# Patient Record
Sex: MALE | Race: WHITE | NOT HISPANIC OR LATINO | Employment: OTHER | URBAN - METROPOLITAN AREA
[De-identification: names, ages, dates, MRNs, and addresses within clinical notes are randomized per-mention and may not be internally consistent; named-entity substitution may affect disease eponyms.]

---

## 2024-03-05 ENCOUNTER — TELEPHONE (OUTPATIENT)
Dept: PRIMARY CARE | Facility: CLINIC | Age: 74
End: 2024-03-05
Payer: COMMERCIAL

## 2024-03-05 DIAGNOSIS — E78.2 MIXED HYPERLIPIDEMIA: ICD-10-CM

## 2024-03-05 DIAGNOSIS — I10 HTN (HYPERTENSION), BENIGN: Primary | ICD-10-CM

## 2024-03-05 DIAGNOSIS — I25.10 CORONARY ARTERY DISEASE INVOLVING NATIVE CORONARY ARTERY OF NATIVE HEART WITHOUT ANGINA PECTORIS: ICD-10-CM

## 2024-03-05 DIAGNOSIS — Z12.5 PROSTATE CANCER SCREENING: ICD-10-CM

## 2024-03-05 NOTE — TELEPHONE ENCOUNTER
Pt has his annual scheduled for 3/27/24. He is requesting blood work  orders to have done before this visit.

## 2024-03-05 NOTE — TELEPHONE ENCOUNTER
Advise pt that lab orders were entered for him.  He can go to the lab and no longer needs to take paper orders with him.  Should do the labs FASTING about 4-7 days before his next appt.

## 2024-03-07 PROBLEM — L02.416 ABSCESS OF LEG, LEFT: Status: ACTIVE | Noted: 2024-03-07

## 2024-03-07 PROBLEM — I25.10 CAD (CORONARY ARTERY DISEASE): Status: ACTIVE | Noted: 2024-03-07

## 2024-03-07 PROBLEM — R10.9 RIGHT FLANK PAIN: Status: ACTIVE | Noted: 2024-03-07

## 2024-03-07 PROBLEM — R91.1 LUNG NODULE: Status: ACTIVE | Noted: 2024-03-07

## 2024-03-07 PROBLEM — L72.3 SEBACEOUS CYST: Status: ACTIVE | Noted: 2024-03-07

## 2024-03-07 PROBLEM — J96.01 ACUTE RESPIRATORY FAILURE WITH HYPOXIA (MULTI): Status: ACTIVE | Noted: 2020-11-28

## 2024-03-07 PROBLEM — U07.1 DISEASE DUE TO SEVERE ACUTE RESPIRATORY SYNDROME CORONAVIRUS 2 (SARS-COV-2): Status: ACTIVE | Noted: 2024-03-07

## 2024-03-07 PROBLEM — H91.90 HEARING LOSS: Status: ACTIVE | Noted: 2024-03-07

## 2024-03-07 PROBLEM — E78.5 HLD (HYPERLIPIDEMIA): Status: ACTIVE | Noted: 2024-03-07

## 2024-03-07 PROBLEM — H93.13 BILATERAL TINNITUS: Status: ACTIVE | Noted: 2019-02-06

## 2024-03-07 PROBLEM — U07.1 PNEUMONIA DUE TO COVID-19 VIRUS: Status: ACTIVE | Noted: 2020-11-28

## 2024-03-07 PROBLEM — R93.89 ABNORMAL CHEST XRAY: Status: ACTIVE | Noted: 2024-03-07

## 2024-03-07 PROBLEM — E66.3 OVERWEIGHT WITH BODY MASS INDEX (BMI) 25.0-29.9: Status: ACTIVE | Noted: 2024-03-07

## 2024-03-07 PROBLEM — R74.01 TRANSAMINITIS: Status: ACTIVE | Noted: 2020-11-28

## 2024-03-07 PROBLEM — J18.9 PNEUMONIA: Status: ACTIVE | Noted: 2024-03-07

## 2024-03-07 PROBLEM — H93.291 IMPAIRMENT OF AUDITORY DISCRIMINATION OF RIGHT EAR: Status: ACTIVE | Noted: 2021-03-18

## 2024-03-07 PROBLEM — H90.3 SENSORINEURAL HEARING LOSS, BILATERAL: Status: ACTIVE | Noted: 2019-02-06

## 2024-03-07 PROBLEM — J12.82 PNEUMONIA DUE TO COVID-19 VIRUS: Status: ACTIVE | Noted: 2020-11-28

## 2024-03-07 PROBLEM — S22.41XA MULTIPLE CLOSED FRACTURES OF RIBS OF RIGHT SIDE: Status: ACTIVE | Noted: 2024-03-07

## 2024-03-07 RX ORDER — LISINOPRIL 10 MG/1
15 TABLET ORAL
COMMUNITY
Start: 2023-10-30

## 2024-03-07 RX ORDER — ATORVASTATIN CALCIUM 80 MG/1
80 TABLET, FILM COATED ORAL
COMMUNITY
Start: 2023-10-30

## 2024-03-14 ENCOUNTER — LAB (OUTPATIENT)
Dept: LAB | Facility: LAB | Age: 74
End: 2024-03-14
Payer: MEDICARE

## 2024-03-14 DIAGNOSIS — I10 HTN (HYPERTENSION), BENIGN: ICD-10-CM

## 2024-03-14 DIAGNOSIS — Z12.5 PROSTATE CANCER SCREENING: ICD-10-CM

## 2024-03-14 DIAGNOSIS — I25.10 CORONARY ARTERY DISEASE INVOLVING NATIVE CORONARY ARTERY OF NATIVE HEART WITHOUT ANGINA PECTORIS: ICD-10-CM

## 2024-03-14 DIAGNOSIS — E78.2 MIXED HYPERLIPIDEMIA: ICD-10-CM

## 2024-03-14 LAB
ALBUMIN SERPL BCP-MCNC: 4.3 G/DL (ref 3.4–5)
ALP SERPL-CCNC: 80 U/L (ref 33–136)
ALT SERPL W P-5'-P-CCNC: 25 U/L (ref 10–52)
ANION GAP SERPL CALC-SCNC: 12 MMOL/L (ref 10–20)
AST SERPL W P-5'-P-CCNC: 23 U/L (ref 9–39)
BILIRUB SERPL-MCNC: 0.7 MG/DL (ref 0–1.2)
BUN SERPL-MCNC: 13 MG/DL (ref 6–23)
CALCIUM SERPL-MCNC: 9.7 MG/DL (ref 8.6–10.3)
CHLORIDE SERPL-SCNC: 103 MMOL/L (ref 98–107)
CHOLEST SERPL-MCNC: 181 MG/DL (ref 0–199)
CHOLESTEROL/HDL RATIO: 2.2
CO2 SERPL-SCNC: 30 MMOL/L (ref 21–32)
CREAT SERPL-MCNC: 0.87 MG/DL (ref 0.5–1.3)
EGFRCR SERPLBLD CKD-EPI 2021: >90 ML/MIN/1.73M*2
GLUCOSE SERPL-MCNC: 97 MG/DL (ref 74–99)
HDLC SERPL-MCNC: 81.2 MG/DL
LDLC SERPL CALC-MCNC: 85 MG/DL
NON HDL CHOLESTEROL: 100 MG/DL (ref 0–149)
POTASSIUM SERPL-SCNC: 4.8 MMOL/L (ref 3.5–5.3)
PROT SERPL-MCNC: 7.2 G/DL (ref 6.4–8.2)
PSA SERPL-MCNC: 0.7 NG/ML
SODIUM SERPL-SCNC: 140 MMOL/L (ref 136–145)
TRIGL SERPL-MCNC: 74 MG/DL (ref 0–149)
TSH SERPL-ACNC: 3.29 MIU/L (ref 0.44–3.98)
VLDL: 15 MG/DL (ref 0–40)

## 2024-03-14 PROCEDURE — G0103 PSA SCREENING: HCPCS

## 2024-03-14 PROCEDURE — 80053 COMPREHEN METABOLIC PANEL: CPT

## 2024-03-14 PROCEDURE — 84443 ASSAY THYROID STIM HORMONE: CPT

## 2024-03-14 PROCEDURE — 80061 LIPID PANEL: CPT

## 2024-03-14 PROCEDURE — 36415 COLL VENOUS BLD VENIPUNCTURE: CPT

## 2024-03-27 ENCOUNTER — OFFICE VISIT (OUTPATIENT)
Dept: PRIMARY CARE | Facility: CLINIC | Age: 74
End: 2024-03-27
Payer: MEDICARE

## 2024-03-27 VITALS
HEIGHT: 71 IN | DIASTOLIC BLOOD PRESSURE: 84 MMHG | BODY MASS INDEX: 28.28 KG/M2 | OXYGEN SATURATION: 97 % | SYSTOLIC BLOOD PRESSURE: 138 MMHG | WEIGHT: 202 LBS | TEMPERATURE: 97.4 F | HEART RATE: 60 BPM

## 2024-03-27 DIAGNOSIS — Z80.0 FAMILY HISTORY OF COLON CANCER: ICD-10-CM

## 2024-03-27 DIAGNOSIS — R93.89 ABNORMAL CHEST XRAY: ICD-10-CM

## 2024-03-27 DIAGNOSIS — I10 HTN (HYPERTENSION), BENIGN: ICD-10-CM

## 2024-03-27 DIAGNOSIS — I25.10 CORONARY ARTERY DISEASE INVOLVING NATIVE CORONARY ARTERY OF NATIVE HEART WITHOUT ANGINA PECTORIS: ICD-10-CM

## 2024-03-27 DIAGNOSIS — Z00.00 MEDICARE ANNUAL WELLNESS VISIT, SUBSEQUENT: Primary | ICD-10-CM

## 2024-03-27 DIAGNOSIS — Z12.11 ENCOUNTER FOR SCREENING FOR MALIGNANT NEOPLASM OF COLON: ICD-10-CM

## 2024-03-27 DIAGNOSIS — E78.2 MIXED HYPERLIPIDEMIA: ICD-10-CM

## 2024-03-27 PROBLEM — J96.01 ACUTE RESPIRATORY FAILURE WITH HYPOXIA (MULTI): Status: RESOLVED | Noted: 2020-11-28 | Resolved: 2024-03-27

## 2024-03-27 PROCEDURE — 99212 OFFICE O/P EST SF 10 MIN: CPT | Performed by: FAMILY MEDICINE

## 2024-03-27 PROCEDURE — G0439 PPPS, SUBSEQ VISIT: HCPCS | Performed by: FAMILY MEDICINE

## 2024-03-27 PROCEDURE — 1160F RVW MEDS BY RX/DR IN RCRD: CPT | Performed by: FAMILY MEDICINE

## 2024-03-27 PROCEDURE — 1159F MED LIST DOCD IN RCRD: CPT | Performed by: FAMILY MEDICINE

## 2024-03-27 PROCEDURE — 3075F SYST BP GE 130 - 139MM HG: CPT | Performed by: FAMILY MEDICINE

## 2024-03-27 PROCEDURE — 3079F DIAST BP 80-89 MM HG: CPT | Performed by: FAMILY MEDICINE

## 2024-03-27 PROCEDURE — 1170F FXNL STATUS ASSESSED: CPT | Performed by: FAMILY MEDICINE

## 2024-03-27 ASSESSMENT — PATIENT HEALTH QUESTIONNAIRE - PHQ9
2. FEELING DOWN, DEPRESSED OR HOPELESS: NOT AT ALL
1. LITTLE INTEREST OR PLEASURE IN DOING THINGS: NOT AT ALL
SUM OF ALL RESPONSES TO PHQ9 QUESTIONS 1 AND 2: 0

## 2024-03-27 ASSESSMENT — ACTIVITIES OF DAILY LIVING (ADL)
GROCERY_SHOPPING: INDEPENDENT
DRESSING: INDEPENDENT
MANAGING_FINANCES: INDEPENDENT
TAKING_MEDICATION: INDEPENDENT
BATHING: INDEPENDENT
DOING_HOUSEWORK: INDEPENDENT

## 2024-03-27 NOTE — PATIENT INSTRUCTIONS
Recommended the RSV, Shingrix, and Prevnar 20 vaccines recommended.    Have chest x-ray as we discussed.    Follow up in 1 year and as needed.    It was a pleasure to see you today. Thank you for choosing us for your health care needs.    If you have lab or other testing completed and have not been informed of results within one week, please call the office for your results.    If you haven't done so, consider signing up for [facility] [employer], the [facility] personal health record. Ask the staff how you can get started.

## 2024-03-27 NOTE — PROGRESS NOTES
Subjective   Reason for Visit: Lawrence Epps is an 73 y.o. male here for a Medicare Wellness visit and follow up monitoring and management of multiple medical conditions.        Reviewed all medications by prescribing practitioner or clinical pharmacist (such as prescriptions, OTCs, herbal therapies and supplements) and documented in the medical record.    HPI    Last colonoscopy: Unknown  Last labs: 3/14/24         Pt has hypertension.   He does not monitor BP at home.   He is compliant with taking his lisinopril.  Denies chest pain, dizziness, LE swelling.      Pt has hyperlipidemia.   He is treated with atorvastatin.   He does try to limit the amount of fatty foods and cholesterol consumed.   Denies chest pain, palpitations and leg cramps.     Pt has known CAD.   Patient denies any chest pain, shortness of breath with exertion, claudication, paroxysmal nocturnal dyspnea, or orthopnea.  Follows with cardiology at the [facility] but is   overdue for follow up.      Patient Self Assessment of Health Status  Patient Self Assessment: Excellent    Nutrition and Exercise  Current Diet: Well Balanced Diet  Adequate Fluid Intake: No  Caffeine: Yes  Exercise Frequency: Regularly    Functional Ability/Level of Safety  Cognitive Impairment Observed: No cognitive impairment observed  Cognitive Impairment Reported: No cognitive impairment reported by patient or family    Home Safety Risk Factors: None    Patient Care Team:  Juan Jose Louis DO as PCP - General     Review of Systems  Constitutional: Patient denies any fever, chills, loss of appetite, or unexplained weight loss.  Cardiovascular: Patient denies any chest pain, shortness of breath with exertion, tachycardia, palpitations, orthopnea, or paroxysmal nocturnal dyspnea.  Respiratory: Patient denies any cough, shortness breath, or wheezing.  Gastrointestinal: Patient denies any nausea, vomiting, diarrhea, constipation, melena, hematochezia, or reflux  "symptoms.  Skin: Denies any rashes or skin lesions.   Neurology: Patient denies any new motor or sensory losses.  Denies any numbness, tingling, weakness, and incoordination of the extremities.  Patient also denies any tremor, seizures, or gait instability.  Endocrinology: Denies any polyuria, polydipsia, polyphagia, or heat/cold intolerance.      Objective   Vitals:  /84   Pulse 60   Temp 36.3 °C (97.4 °F)   Ht 1.803 m (5' 11\")   Wt 91.6 kg (202 lb)   SpO2 97%   BMI 28.17 kg/m²       Physical Exam  General Appearance: Alert and cooperative, in no acute distress, well-developed/well-nourished.  Neck: Supple and without adenopathy or rigidity.  There is no JVD at 90° and no carotid bruits are noted.  There is no thyromegaly, thyroid tenderness, or palpable thyroid nodules.  Heart: Regular rate and rhythm without murmur or ectopy.  Respiratory: Lungs are clear to auscultation bilaterally with good air exchange.  Good respiratory effort and no accessory muscle use.  Skin: Good turgor, moist, warm and without rashes or lesions.  Neurological exam: Alert and oriented ×3, no tremor, normal gait.  Extremities: No clubbing, cyanosis, or edema  Head: Normocephalic and atraumatic  Eyes: Conjunctiva and sclera appear normal bilaterally.  Anterior chambers appear clear.  Extraocular muscles are intact.  ENT: Mucous membranes are moist, external auditory canals and tympanic membranes are within normal limits bilaterally.  Pharynx is without erythema or exudate.  There is no noted rhinorrhea.  Lymph:  No noted cervical, clavicular, axillary, or inguinal adenopathy.  Abdomen: Soft, nontender/nondistended.  No masses, guarding, rebound, or rigidity.  Bowel sounds are normal.  No abdominal bruits.  No CVA tenderness.  There is no widening of the aortic pulsation.  Musculoskeletal: No noted joint effusions or gross bony deformity.      Assessment/Plan   MEDICARE ANNUAL WELLNESS EXAM: Appropriate screenings for the " patient's current age were discussed at length.  I encouraged a low-fat/low-cholesterol diet and routine exercise.  Prevnar 20, Shingrix, and RSV vaccines were recommended.      HTN: Blood pressure not ideal.  We discussed a goal of achieving blood pressures less than 130 systolic and less than 80 diastolic.  Patient will work on following a low-sodium diet, increasing his exercise and reducing his weight.  He will follow-up with cardiology at the [facility] at which time his blood pressure can be reassessed.  He is welcome to return here in the office to have a blood pressure check as well.  He may need to have his lisinopril dose increased if BP not improving.       Hyperlipidemia: Patient will continue with the current medication. Dietary changes, exercise, and maintenance of healthy weight were discussed at length.     CAD:   Stable based on symptoms.  Follows with cardiology at [facility].  Patient is without worrisome signs or symptoms for unstable angina.  Risk factor modification was discussed at length.  Dietary changes, exercise and maintenance of a healthy weight were discussed.  Had ECHO 8/1/2022:  The left ventricle is normal in size. Left ventricular systolic function is   normal. EF = 70 ± 5% (2D biplane) Normal left ventricular diastolic function.   - The right ventricle is normal in size. Right ventricular systolic function is normal.   - Exam was compared with the prior  echocardiographic exam performed on   6/16/2016 (stress). There is no significant change.        Abnormal Chest Xray:  12/17/2020 CXR was abnormal after being diagnosed with Covid.  A repeat chest x-ray was previously ordered but never completed.   3/27/2024:  Follow up CXR was ordered again and pt encouraged to complete testing in near future.     Colon CA Screening: A referral for a colonoscopy was provided today.   He wants to schedule his own exam at the [facility] as that is where he has had them completed in the  past.        Orders Placed This Encounter   Procedures    XR chest 2 views    Referral to Gastroenterology

## 2025-03-31 ENCOUNTER — APPOINTMENT (OUTPATIENT)
Dept: PRIMARY CARE | Facility: CLINIC | Age: 75
End: 2025-03-31
Payer: MEDICARE

## 2025-06-09 ENCOUNTER — APPOINTMENT (OUTPATIENT)
Dept: PRIMARY CARE | Facility: CLINIC | Age: 75
End: 2025-06-09
Payer: MEDICARE

## 2025-06-09 VITALS
DIASTOLIC BLOOD PRESSURE: 74 MMHG | OXYGEN SATURATION: 92 % | HEIGHT: 71 IN | SYSTOLIC BLOOD PRESSURE: 128 MMHG | BODY MASS INDEX: 28.03 KG/M2 | HEART RATE: 63 BPM | WEIGHT: 200.2 LBS | TEMPERATURE: 97.9 F

## 2025-06-09 DIAGNOSIS — I25.10 CORONARY ARTERY DISEASE INVOLVING NATIVE CORONARY ARTERY OF NATIVE HEART WITHOUT ANGINA PECTORIS: ICD-10-CM

## 2025-06-09 DIAGNOSIS — Z12.5 PROSTATE CANCER SCREENING: ICD-10-CM

## 2025-06-09 DIAGNOSIS — Z11.59 NEED FOR HEPATITIS C SCREENING TEST: ICD-10-CM

## 2025-06-09 DIAGNOSIS — R93.89 ABNORMAL CHEST XRAY: ICD-10-CM

## 2025-06-09 DIAGNOSIS — E78.2 MIXED HYPERLIPIDEMIA: ICD-10-CM

## 2025-06-09 DIAGNOSIS — Z00.00 MEDICARE ANNUAL WELLNESS VISIT, SUBSEQUENT: Primary | ICD-10-CM

## 2025-06-09 DIAGNOSIS — I10 HTN (HYPERTENSION), BENIGN: ICD-10-CM

## 2025-06-09 DIAGNOSIS — J20.9 ACUTE BRONCHITIS, UNSPECIFIED ORGANISM: ICD-10-CM

## 2025-06-09 PROCEDURE — 1170F FXNL STATUS ASSESSED: CPT | Performed by: FAMILY MEDICINE

## 2025-06-09 PROCEDURE — 3008F BODY MASS INDEX DOCD: CPT | Performed by: FAMILY MEDICINE

## 2025-06-09 PROCEDURE — G2211 COMPLEX E/M VISIT ADD ON: HCPCS | Performed by: FAMILY MEDICINE

## 2025-06-09 PROCEDURE — 3078F DIAST BP <80 MM HG: CPT | Performed by: FAMILY MEDICINE

## 2025-06-09 PROCEDURE — 1160F RVW MEDS BY RX/DR IN RCRD: CPT | Performed by: FAMILY MEDICINE

## 2025-06-09 PROCEDURE — 3074F SYST BP LT 130 MM HG: CPT | Performed by: FAMILY MEDICINE

## 2025-06-09 PROCEDURE — G0439 PPPS, SUBSEQ VISIT: HCPCS | Performed by: FAMILY MEDICINE

## 2025-06-09 PROCEDURE — 1159F MED LIST DOCD IN RCRD: CPT | Performed by: FAMILY MEDICINE

## 2025-06-09 PROCEDURE — 99213 OFFICE O/P EST LOW 20 MIN: CPT | Performed by: FAMILY MEDICINE

## 2025-06-09 RX ORDER — LISINOPRIL 10 MG/1
15 TABLET ORAL
Qty: 135 TABLET | Refills: 3 | Status: SHIPPED | OUTPATIENT
Start: 2025-06-09

## 2025-06-09 ASSESSMENT — ACTIVITIES OF DAILY LIVING (ADL)
TAKING_MEDICATION: INDEPENDENT
DRESSING: INDEPENDENT
BATHING: INDEPENDENT
MANAGING_FINANCES: INDEPENDENT
GROCERY_SHOPPING: INDEPENDENT
DOING_HOUSEWORK: INDEPENDENT

## 2025-06-09 ASSESSMENT — PATIENT HEALTH QUESTIONNAIRE - PHQ9
SUM OF ALL RESPONSES TO PHQ9 QUESTIONS 1 AND 2: 0
1. LITTLE INTEREST OR PLEASURE IN DOING THINGS: NOT AT ALL
2. FEELING DOWN, DEPRESSED OR HOPELESS: NOT AT ALL

## 2025-06-09 ASSESSMENT — ENCOUNTER SYMPTOMS
LOSS OF SENSATION IN FEET: 0
DEPRESSION: 0
OCCASIONAL FEELINGS OF UNSTEADINESS: 0

## 2025-06-09 NOTE — PROGRESS NOTES
"Subjective   Reason for Visit: Lawrence Epps is an 74 y.o. male here for a Follow up and a Medicare Wellness visit.     Past Medical, Surgical, and Family History reviewed and updated in chart.         HPI    Patient reports being seen at the [facility] in [place] for a really bad cough. He was prescribed Tessalon pearls and a zpack.  Patient states \"significantly better\" but still has some increased mucus.       No recent labs  PSA: 03/14/2024  Colon: 09/19/2024      Pt has hypertension.   He does not monitor BP at home.   He is compliant with taking his lisinopril.  Denies chest pain, dizziness, LE swelling.      Pt has hyperlipidemia.   He is treated with atorvastatin.   He does try to limit the amount of fatty foods and cholesterol consumed.   Denies chest pain, palpitations and leg cramps.      Pt has known CAD.   Patient denies any chest pain, shortness of breath with exertion, claudication, paroxysmal nocturnal dyspnea, or orthopnea.  Follows with cardiology at the [facility] but is   overdue for follow up.       Patient Self Assessment of Health Status  Patient Self Assessment: Excellent    Nutrition and Exercise  Current Diet: Well Balanced Diet  Adequate Fluid Intake: Yes  Caffeine: Yes  Exercise Frequency: Regularly    Functional Ability/Level of Safety  Cognitive Impairment Observed: No cognitive impairment observed  Cognitive Impairment Reported: No cognitive impairment reported by patient or family    Home Safety Risk Factors: None    Patient Care Team:  Juan Jose Louis DO as PCP - General     Review of Systems  Constitutional: Patient denies any fever, chills, loss of appetite, or unexplained weight losss.    HEENT: Denies any headache, sore throat, eye pain, ear pain, decreased vision, or decreased hearing.  Patient has had some rhinorrhea.    Cardiovascular: Patient denies any chest pain, shortness of breath with exertion, tachycardia, palpitations, orthopnea, or paroxysmal nocturnal " "dyspnea.    Respiratory: Patient denies any shortness breath, or wheezing.  He has had a lingering cough.    Gastrointestinal: Patient denies any nausea, vomiting, diarrhea, constipation, melena, hematochezia, or reflux symptoms.  Musculoskeletal: Patient denies any myalgia, arthralgia, joint swelling, or joint deformity   Skin: Denies any rashes or skin lesions.   Neurology: Patient denies any new motor or sensory losses.  Denies any numbness, tingling, weakness, and incoordination of the extremities.  Patient also denies any tremor, seizures, or gait instability.  Endocrinology: Denies any polyuria, polydipsia, polyphagia, or heat/cold intolerance.  Psychiatric: Denies any anxiety, depression, or suicidal/homicidal ideation.  Hematology: Patient denies any abnormal bruising or bleeding.      Objective   Vitals:  /74 (BP Location: Right arm, Patient Position: Sitting, BP Cuff Size: Child)   Pulse 63   Temp 36.6 °C (97.9 °F) (Temporal)   Ht 1.803 m (5' 11\")   Wt 90.8 kg (200 lb 3.2 oz)   SpO2 92%   BMI 27.92 kg/m²       Physical Exam  Gen. appearance: Alert and cooperative, no acute distress, well-developed/well-nourished.  Head: Normocephalic and atraumatic.  EENT: Pupils are equal round reactive to light, extraocular muscles are intact, mucous membranes are moist, external auditory canals and tympanic membranes are within normal limits bilaterally, pharynx is without erythema or exudate, there is no noted rhinorrhea.  Neck: Supple and without adenopathy, no JVD at 90° and no carotid bruits are noted.  There is no thyromegaly, thyroid tenderness, or palpable thyroid nodules.  Cardiovascular: Regular rate and rhythm without murmur or ectopy.    Respiratory: Lungs are clear to auscultation bilaterally with good air exchange.  Good respiratory effort and no accessory muscle use.  There is trace expiratory wheeze in the left lower lung.    Abdomen: Soft, nontender/nondistended.  No masses, guarding, " rebound, or rigidity.  No hepatosplenomegaly, abdominal bruits, or CVA tenderness.  Bowel sounds are normal.  There is no widening of the aortic pulsation.  Musculoskeletal: Patient has good range of motion of the shoulders, elbows, wrists, hips, knees.  There are no noted joint effusions or deformities.  Skin: Good turgor, moist, warm and without rashes or lesions.  Lymph nodes: No cervical, clavicular, or inguinal adenopathy.  Neurological exam: Alert and oriented ×3, no tremor, normal gait.  Psychiatric: Appropriate mood and affect, good insight and judgment, no delusions or thought disorders, no suicidal or homicidal ideation.  Extremities: No clubbing, cyanosis, or edema      Assessment/Plan     MEDICARE ANNUAL WELLNESS EXAM / ANNUAL PHYSICAL: Appropriate screenings for the patient's current age were discussed at length.  I encouraged a low-fat/low-cholesterol diet and routine exercise.  VACCINES RECOMMENDED:  Prevnar 20  Shingrix  RSV  Tdap    Hypertension:  Stable based on in office readings.  Patient to continue the current antihypertensive therapy.  Follow-up    Hyperlipidemia:   Patient will continue with the current medication.  Dietary changes, exercise, and maintenance of healthy weight were discussed at length.  Lab Results   Component Value Date    CHOL 181 03/14/2024    CHOL 190 12/17/2020     Lab Results   Component Value Date    HDL 81.2 03/14/2024    HDL 56.0 12/17/2020     Lab Results   Component Value Date    LDLCALC 85 03/14/2024     Lab Results   Component Value Date    TRIG 74 03/14/2024    TRIG 85 12/17/2020   Last LDL was mildly above goal on last labs.  LDL goal of at least <70 was discussed.    Coronary artery disease:   Continues to follow with cardiology at the [facility].  Patient is without worrisome signs or symptoms for unstable angina.  Risk factor modification was discussed at length.  Dietary changes, exercise and maintenance of a healthy weight were discussed.  Had ECHO  8/1/2022:  The left ventricle is normal in size. Left ventricular systolic function is   normal. EF = 70 ± 5% (2D biplane) Normal left ventricular diastolic function.   - The right ventricle is normal in size. Right ventricular systolic function is normal.   - Exam was compared with the prior [facility] echocardiographic exam performed on   6/16/2016 (stress). There is no significant change.     Abnormal CXR:  12/17/2020 CXR was abnormal after being diagnosed with Covid.  A repeat chest x-ray was previously ordered but never completed.   3/27/2024:  Follow up CXR was ordered again and pt encouraged to complete testing in near future.  6/9/2025: Patient was again encouraged to complete the chest x-ray for comparison    Prostate cancer screening:  We will check a PSA level with his next labs.    Need for hepatitis C screening:  We discussed the need for hepatitis C antibody screen and that was ordered to be done with his next labs.    Acute bronchitis:  Chest x-ray ordered for further evaluation of his current symptoms.  He has already completed a course of azithromycin and symptoms were improving.      COLONOSCOPY DUE 9/20/2029

## 2025-06-09 NOTE — PATIENT INSTRUCTIONS
Recommended vaccines:    Shingrix (shingles)... must get at pharmacy  Tetanus vaccine  Pneumonia vaccine (Prevnar 20)      Follow up in 6 months.    It was a pleasure to see you today. Thank you for choosing us for your health care needs.    If you have lab or other testing completed and have not been informed of results within one week, please call the office for your results.    If you haven't done so, consider signing up for [facility] [email or web address], the [facility] personal health record. Ask the staff how you can get started.